# Patient Record
Sex: MALE | Race: WHITE | NOT HISPANIC OR LATINO | URBAN - METROPOLITAN AREA
[De-identification: names, ages, dates, MRNs, and addresses within clinical notes are randomized per-mention and may not be internally consistent; named-entity substitution may affect disease eponyms.]

---

## 2018-02-20 ENCOUNTER — FOLLOW UP ESTABLISHED (OUTPATIENT)
Dept: URBAN - METROPOLITAN AREA CLINIC 64 | Facility: CLINIC | Age: 58
End: 2018-02-20
Payer: COMMERCIAL

## 2018-02-20 DIAGNOSIS — H52.13 MYOPIA, BILATERAL: Primary | ICD-10-CM

## 2018-02-20 PROCEDURE — 92004 COMPRE OPH EXAM NEW PT 1/>: CPT | Performed by: OPTOMETRIST

## 2018-02-20 PROCEDURE — 92015 DETERMINE REFRACTIVE STATE: CPT | Performed by: OPTOMETRIST

## 2018-02-20 ASSESSMENT — KERATOMETRY
OS: 47.07
OD: 46.95

## 2018-02-20 ASSESSMENT — VISUAL ACUITY
OD: 20/20
OS: 20/20

## 2018-02-20 ASSESSMENT — INTRAOCULAR PRESSURE
OD: 13
OS: 11

## 2019-03-13 ENCOUNTER — FOLLOW UP ESTABLISHED (OUTPATIENT)
Dept: URBAN - METROPOLITAN AREA CLINIC 64 | Facility: CLINIC | Age: 59
End: 2019-03-13
Payer: COMMERCIAL

## 2019-03-13 PROCEDURE — 92014 COMPRE OPH EXAM EST PT 1/>: CPT | Performed by: OPTOMETRIST

## 2019-03-13 PROCEDURE — 92015 DETERMINE REFRACTIVE STATE: CPT | Performed by: OPTOMETRIST

## 2019-03-13 ASSESSMENT — INTRAOCULAR PRESSURE
OS: 15
OD: 14

## 2019-03-13 ASSESSMENT — VISUAL ACUITY
OS: 20/20
OD: 20/20

## 2019-03-13 ASSESSMENT — KERATOMETRY
OD: 46.84
OS: 47.24

## 2021-07-02 ENCOUNTER — OFFICE VISIT (OUTPATIENT)
Dept: URBAN - METROPOLITAN AREA CLINIC 64 | Facility: CLINIC | Age: 61
End: 2021-07-02
Payer: COMMERCIAL

## 2021-07-02 PROCEDURE — 92014 COMPRE OPH EXAM EST PT 1/>: CPT | Performed by: OPTOMETRIST

## 2021-07-02 ASSESSMENT — VISUAL ACUITY
OD: 20/20
OS: 20/20

## 2021-07-02 ASSESSMENT — KERATOMETRY
OD: 46.91
OS: 47.27

## 2021-07-02 ASSESSMENT — INTRAOCULAR PRESSURE
OD: 16
OS: 16

## 2021-07-02 NOTE — IMPRESSION/PLAN
Impression: Myopia, bilateral Plan: Still no symptoms at distance. Doing well with occasional OTC reader use when needed. Good ocular health.